# Patient Record
Sex: FEMALE | Race: WHITE | NOT HISPANIC OR LATINO
[De-identification: names, ages, dates, MRNs, and addresses within clinical notes are randomized per-mention and may not be internally consistent; named-entity substitution may affect disease eponyms.]

---

## 2022-01-05 PROBLEM — Z00.00 ENCOUNTER FOR PREVENTIVE HEALTH EXAMINATION: Status: ACTIVE | Noted: 2022-01-05

## 2022-01-06 ENCOUNTER — APPOINTMENT (OUTPATIENT)
Dept: PEDIATRIC ORTHOPEDIC SURGERY | Facility: CLINIC | Age: 42
End: 2022-01-06

## 2022-06-02 ENCOUNTER — APPOINTMENT (OUTPATIENT)
Dept: RHEUMATOLOGY | Facility: CLINIC | Age: 42
End: 2022-06-02
Payer: COMMERCIAL

## 2022-06-02 VITALS
OXYGEN SATURATION: 98 % | DIASTOLIC BLOOD PRESSURE: 60 MMHG | BODY MASS INDEX: 23.19 KG/M2 | HEART RATE: 99 BPM | TEMPERATURE: 98.9 F | WEIGHT: 126 LBS | SYSTOLIC BLOOD PRESSURE: 132 MMHG | HEIGHT: 62 IN | RESPIRATION RATE: 16 BRPM

## 2022-06-02 DIAGNOSIS — Z82.49 FAMILY HISTORY OF ISCHEMIC HEART DISEASE AND OTHER DISEASES OF THE CIRCULATORY SYSTEM: ICD-10-CM

## 2022-06-02 DIAGNOSIS — Z82.69 FAMILY HISTORY OF OTHER DISEASES OF THE MUSCULOSKELETAL SYSTEM AND CONNECTIVE TISSUE: ICD-10-CM

## 2022-06-02 DIAGNOSIS — Z78.9 OTHER SPECIFIED HEALTH STATUS: ICD-10-CM

## 2022-06-02 PROCEDURE — 99205 OFFICE O/P NEW HI 60 MIN: CPT

## 2022-06-02 RX ORDER — GABAPENTIN 100 MG/1
100 CAPSULE ORAL
Qty: 90 | Refills: 2 | Status: ACTIVE | COMMUNITY

## 2022-06-02 NOTE — REVIEW OF SYSTEMS
[Dry Eyes] : dryness of the eyes [Palpitations] : palpitations [Joint Pain] : joint pain [Joint Swelling] : joint swelling [Joint Stiffness] : joint stiffness [Fever] : no fever [Chills] : no chills [Eye Pain] : no eye pain [Red Eyes] : eyes not red [Shortness Of Breath] : no shortness of breath [Cough] : no cough [Abdominal Pain] : no abdominal pain [Diarrhea] : no diarrhea [Dysuria] : no dysuria [FreeTextEntry6] : No pleuritic C/P [FreeTextEntry8] : There is urinary frequency

## 2022-06-02 NOTE — HISTORY OF PRESENT ILLNESS
[FreeTextEntry1] : Patient reports she was Dx'ed with AS in 2015. She was recommended to advance her medication to biologics, but refused citing possible SEs. Has been on SSZ without benefit; also on MTX (stopped because of perceived SEs) but no other DMARDs. She has been on numerous NSAID with only marginal benefit. There have been no extra-articular manifestations.

## 2022-06-02 NOTE — PHYSICAL EXAM
[General Appearance - Alert] : alert [General Appearance - In No Acute Distress] : in no acute distress [General Appearance - Well Nourished] : well nourished [General Appearance - Well Developed] : well developed [Sclera] : the sclera and conjunctiva were normal [] : no respiratory distress [Auscultation Breath Sounds / Voice Sounds] : lungs were clear to auscultation bilaterally [FreeTextEntry1] : There is decreased rotation of the C spine. There is left GH joint tenderness. There is left elbow joint line tenderness with bogginess and a flexion deformity. There is right wrist swelling and ternderness, with warmth and swelling of the extensor tendon sheath. There is right ankle swelling, warmth and joint line tenderness. There is a valgus deformity and subluxation of the talotibial joint on the left. There deformities of the toes on t eh left. There is pain on stressing bilateral SI joints during provocative testing.

## 2022-06-02 NOTE — REASON FOR VISIT
[Initial Eval - Existing Diagnosis] : an initial evaluation of an existing diagnosis [FreeTextEntry1] : AS

## 2022-06-03 LAB
ALBUMIN SERPL ELPH-MCNC: 4.7 G/DL
ALP BLD-CCNC: 82 U/L
ALT SERPL-CCNC: 12 U/L
ANION GAP SERPL CALC-SCNC: 13 MMOL/L
AST SERPL-CCNC: 13 U/L
BASOPHILS # BLD AUTO: 0.06 K/UL
BASOPHILS NFR BLD AUTO: 0.7 %
BILIRUB SERPL-MCNC: 0.2 MG/DL
BUN SERPL-MCNC: 17 MG/DL
CALCIUM SERPL-MCNC: 9.7 MG/DL
CHLORIDE SERPL-SCNC: 100 MMOL/L
CO2 SERPL-SCNC: 24 MMOL/L
CREAT SERPL-MCNC: 0.82 MG/DL
CRP SERPL-MCNC: 25 MG/L
EGFR: 91 ML/MIN/1.73M2
EOSINOPHIL # BLD AUTO: 0.19 K/UL
EOSINOPHIL NFR BLD AUTO: 2.1 %
ERYTHROCYTE [SEDIMENTATION RATE] IN BLOOD BY WESTERGREN METHOD: 38 MM/HR
GLUCOSE SERPL-MCNC: 91 MG/DL
HCT VFR BLD CALC: 43.4 %
HGB BLD-MCNC: 13.7 G/DL
IMM GRANULOCYTES NFR BLD AUTO: 0.4 %
LYMPHOCYTES # BLD AUTO: 1.62 K/UL
LYMPHOCYTES NFR BLD AUTO: 17.9 %
MAN DIFF?: NORMAL
MCHC RBC-ENTMCNC: 29.1 PG
MCHC RBC-ENTMCNC: 31.6 GM/DL
MCV RBC AUTO: 92.1 FL
MONOCYTES # BLD AUTO: 0.59 K/UL
MONOCYTES NFR BLD AUTO: 6.5 %
NEUTROPHILS # BLD AUTO: 6.57 K/UL
NEUTROPHILS NFR BLD AUTO: 72.4 %
PLATELET # BLD AUTO: 300 K/UL
POTASSIUM SERPL-SCNC: 5 MMOL/L
PROT SERPL-MCNC: 7.4 G/DL
RBC # BLD: 4.71 M/UL
RBC # FLD: 13.2 %
SODIUM SERPL-SCNC: 137 MMOL/L
WBC # FLD AUTO: 9.07 K/UL

## 2022-06-10 ENCOUNTER — APPOINTMENT (OUTPATIENT)
Dept: RHEUMATOLOGY | Facility: CLINIC | Age: 42
End: 2022-06-10
Payer: COMMERCIAL

## 2022-06-10 VITALS
OXYGEN SATURATION: 99 % | HEART RATE: 89 BPM | HEIGHT: 62 IN | SYSTOLIC BLOOD PRESSURE: 108 MMHG | DIASTOLIC BLOOD PRESSURE: 60 MMHG | RESPIRATION RATE: 16 BRPM | WEIGHT: 126 LBS | TEMPERATURE: 98.7 F | BODY MASS INDEX: 23.19 KG/M2

## 2022-06-10 DIAGNOSIS — M21.962 UNSPECIFIED ACQUIRED DEFORMITY OF LEFT LOWER LEG: ICD-10-CM

## 2022-06-10 DIAGNOSIS — M45.9 ANKYLOSING SPONDYLITIS OF UNSPECIFIED SITES IN SPINE: ICD-10-CM

## 2022-06-10 PROCEDURE — 99215 OFFICE O/P EST HI 40 MIN: CPT

## 2022-06-10 NOTE — PHYSICAL EXAM
[General Appearance - Alert] : alert [General Appearance - In No Acute Distress] : in no acute distress [General Appearance - Well Nourished] : well nourished [General Appearance - Well Developed] : well developed [Sclera] : the sclera and conjunctiva were normal [] : no rash [Motor Exam] : the motor exam was normal [FreeTextEntry1] : There is improved rotation of the C spine. There is reduced left GH joint tenderness. There is decreased right wrist swelling and tenderness, with swelling of the extensor tendon sheath. There is improved right ankle swelling. There is a valgus deformity and subluxation of the talotibial joint on the left. There deformities of the toes on the left. There is pain on stressing bilateral SI joints during provocative testing.

## 2022-06-14 DIAGNOSIS — Z15.89 GENETIC SUSCEPTIBILITY TO OTHER DISEASE: ICD-10-CM

## 2022-06-14 LAB — HLA-B27 RELATED AG QL: POSITIVE

## 2022-06-20 DIAGNOSIS — Z11.1 ENCOUNTER FOR SCREENING FOR RESPIRATORY TUBERCULOSIS: ICD-10-CM

## 2022-06-20 DIAGNOSIS — Z11.59 ENCOUNTER FOR SCREENING FOR OTHER VIRAL DISEASES: ICD-10-CM

## 2022-06-27 RX ORDER — CELECOXIB 200 MG/1
200 CAPSULE ORAL DAILY
Qty: 30 | Refills: 0 | Status: ACTIVE | COMMUNITY
Start: 1900-01-01 | End: 1900-01-01

## 2022-07-01 ENCOUNTER — RX RENEWAL (OUTPATIENT)
Age: 42
End: 2022-07-01

## 2022-07-01 RX ORDER — PREDNISONE 5 MG/1
5 TABLET ORAL
Qty: 100 | Refills: 1 | Status: DISCONTINUED | COMMUNITY
Start: 2022-06-02 | End: 2022-07-01

## 2022-08-17 ENCOUNTER — APPOINTMENT (OUTPATIENT)
Dept: RHEUMATOLOGY | Facility: CLINIC | Age: 42
End: 2022-08-17

## 2022-08-18 ENCOUNTER — RX RENEWAL (OUTPATIENT)
Age: 42
End: 2022-08-18

## 2023-02-28 ENCOUNTER — OFFICE (OUTPATIENT)
Dept: URBAN - METROPOLITAN AREA CLINIC 30 | Facility: CLINIC | Age: 43
Setting detail: OPHTHALMOLOGY
End: 2023-02-28
Payer: MEDICARE

## 2023-02-28 DIAGNOSIS — M45.9: ICD-10-CM

## 2023-02-28 DIAGNOSIS — H16.223: ICD-10-CM

## 2023-02-28 PROCEDURE — 92004 COMPRE OPH EXAM NEW PT 1/>: CPT | Performed by: OPHTHALMOLOGY

## 2023-02-28 ASSESSMENT — CONFRONTATIONAL VISUAL FIELD TEST (CVF)
OS_FINDINGS: FULL
OD_FINDINGS: FULL

## 2023-02-28 ASSESSMENT — REFRACTION_CURRENTRX
OS_SPHERE: -3.75
OD_CYLINDER: +1.75
OD_OVR_VA: 20/
OS_OVR_VA: 20/
OD_SPHERE: -3.75
OD_AXIS: 080
OS_AXIS: 080
OS_CYLINDER: +1.75

## 2023-02-28 ASSESSMENT — REFRACTION_AUTOREFRACTION
OD_CYLINDER: +1.75
OD_SPHERE: -3.50
OS_AXIS: 84
OS_CYLINDER: +2.25
OS_SPHERE: -4.75
OD_AXIS: 74

## 2023-02-28 ASSESSMENT — SPHEQUIV_DERIVED
OS_SPHEQUIV: -3.625
OS_SPHEQUIV: -3.625
OD_SPHEQUIV: -2.625
OD_SPHEQUIV: -2.625

## 2023-02-28 ASSESSMENT — REFRACTION_MANIFEST
OD_VA1: 20/20-1
OS_AXIS: 84
OS_SPHERE: -4.75
OD_SPHERE: -3.50
OD_AXIS: 75
OS_CYLINDER: +2.25
OD_CYLINDER: +1.75
OS_VA1: 20/20

## 2023-02-28 ASSESSMENT — VISUAL ACUITY
OD_BCVA: 20/20
OS_BCVA: 20/20

## 2025-05-03 ENCOUNTER — OFFICE (OUTPATIENT)
Facility: LOCATION | Age: 45
Setting detail: OPHTHALMOLOGY
End: 2025-05-03
Payer: MEDICARE

## 2025-05-03 DIAGNOSIS — H43.393: ICD-10-CM

## 2025-05-03 DIAGNOSIS — H16.223: ICD-10-CM

## 2025-05-03 PROCEDURE — 92014 COMPRE OPH EXAM EST PT 1/>: CPT

## 2025-05-03 ASSESSMENT — REFRACTION_MANIFEST
OD_CYLINDER: -2.00
OS_AXIS: 180
OS_AXIS: 180
OD_AXIS: 180
OD_SPHERE: -2.25
OS_SPHERE: -2.50
OS_VA1: 20/20 -2
OD_VA1: 20/20 -2
OD_AXIS: 168
OD_VA1: 20/20 -2
OD_SPHERE: -1.25
OS_SPHERE: -2.50
OS_CYLINDER: -1.50
OD_CYLINDER: -1.50
OS_CYLINDER: -1.50
OS_VA1: 20/20 -2

## 2025-05-03 ASSESSMENT — REFRACTION_CURRENTRX
OS_SPHERE: -2.50
OD_OVR_VA: 20/
OS_CYLINDER: -1.25
OS_AXIS: 177
OD_CYLINDER: -1.50
OD_AXIS: 177
OS_OVR_VA: 20/
OD_SPHERE: -2.25

## 2025-05-03 ASSESSMENT — TONOMETRY
OD_IOP_MMHG: 13
OS_IOP_MMHG: 12

## 2025-05-03 ASSESSMENT — CONFRONTATIONAL VISUAL FIELD TEST (CVF)
OS_FINDINGS: FULL
OD_FINDINGS: FULL

## 2025-05-03 ASSESSMENT — REFRACTION_AUTOREFRACTION
OS_CYLINDER: -1.50
OD_AXIS: 168
OS_AXIS: 180
OD_CYLINDER: -2.00
OS_SPHERE: -2.50
OD_SPHERE: -1.25

## 2025-05-03 ASSESSMENT — KERATOMETRY
OS_K2POWER_DIOPTERS: 46.25
OD_AXISANGLE_DEGREES: 078
OD_K1POWER_DIOPTERS: 44.50
OS_K1POWER_DIOPTERS: 44.50
OD_K2POWER_DIOPTERS: 46.50
OS_AXISANGLE_DEGREES: 090

## 2025-05-03 ASSESSMENT — TEAR BREAK UP TIME (TBUT)
OD_TBUT: 3+
OS_TBUT: 3+

## 2025-05-03 ASSESSMENT — SUPERFICIAL PUNCTATE KERATITIS (SPK)
OD_SPK: 3+
OS_SPK: 3+

## 2025-05-03 ASSESSMENT — VISUAL ACUITY
OD_BCVA: 20/150 +1
OS_BCVA: 20/100 +1

## 2025-05-22 ENCOUNTER — APPOINTMENT (OUTPATIENT)
Dept: OPHTHALMOLOGY | Facility: CLINIC | Age: 45
End: 2025-05-22